# Patient Record
Sex: FEMALE | Race: WHITE | Employment: OTHER | URBAN - METROPOLITAN AREA
[De-identification: names, ages, dates, MRNs, and addresses within clinical notes are randomized per-mention and may not be internally consistent; named-entity substitution may affect disease eponyms.]

---

## 2020-07-16 DIAGNOSIS — F51.01 INSOMNIA, IDIOPATHIC: Primary | ICD-10-CM

## 2020-08-21 VITALS — WEIGHT: 108 LBS | BODY MASS INDEX: 16 KG/M2 | HEIGHT: 69 IN

## 2020-08-21 ASSESSMENT — MIFFLIN-ST. JEOR: SCORE: 1066.32

## 2020-08-21 NOTE — PROGRESS NOTES
"Thelma Mejias is a 69 year old female who is being evaluated via a billable video visit.      The patient has been notified of following:     \"This video visit will be conducted via a call between you and your physician/provider. We have found that certain health care needs can be provided without the need for an in-person physical exam.  This service lets us provide the care you need with a video conversation.  If a prescription is necessary we can send it directly to your pharmacy.  If lab work is needed we can place an order for that and you can then stop by our lab to have the test done at a later time.    Video visits are billed at different rates depending on your insurance coverage.  Please reach out to your insurance provider with any questions.    If during the course of the call the physician/provider feels a video visit is not appropriate, you will not be charged for this service.\"    Patient has given verbal consent for Video visit? Yes  How would you like to obtain your AVS? MyChart  If you are dropped from the video visit, the video invite should be resent to: Send to e-mail at: [email or web address]  Will anyone else be joining your video visit? No    Patient new patient visit for sleep maintenance insomnia.  Life long problem.  Indicates her mother gave her a sleeping medication when she was 9 months old.  She had a PSG done at [facility] about 40 or so years ago.  Does not remember too much.  Subsequently she used zolpidem but only briefly (had a uncertain parasomnia) and then later took temazepam which worked well for a few years, and she has now used it only rarely over the last 10 years.  She also has tried numerous alternative, homeopathic agents, which she not found helpful.     She appears to have two fairly clear problems:   -Advanced circadian rhythm: If given in the opportunity she would fall asleep in the early evening around 7:30pm and sleep until about 11pm-midnight then she will sleep until " "at most 2am.  She fights falling asleep until about 9pm awakens at 11pm then falls asleep and then wakes up at 2am with only minimal sleep until 5am.    -psychophysiological insomnia: clear hypervigilance.  Spends quite a bit of the day trying to sleep.      Restlessness if very tricky.  She indicates she has \"anxious muscles\" and will do yoga stretches in the middle of the night.  Uncertain iron levels.      No history concerning for sleep disordered breathing or parasomnia.      Patient has occasional trouble staying awake during driving.  We spoke about this at some length and on the critical need to never drive if tired or sleepy.     Assessment/Plan:  -Advanced Circadian Rhythm: Use bright light 10,000 lux light box   -Psychophysiological insomnia: CBT-I   If either of these are not particularly helpful we could try treating restlessness with gabapentin or pramipexole.    We will check a ferritin level now  (permission given to leave message at ) and advised that she will take Vitron-C (we discussed side effects) if they are low.     Patient advised to never drive if tired or sleepy.  She indicates that she understands.      Video-Visit Details    Type of service:  Video Visit    Video Start Time: 0727  Video End Time: 0828    Originating Location (pt. Location): Home    Distant Location (provider location):  [facility]     Platform used for Video Visit: Glenna Cuadra MD     Addendum  I called and let the patient know the results of her ferritin which were 63.  Advised working on iron intake in diet if she would like to take one Vitron C a day or every other day that would be fine.  We have discussed side effects.                 "

## 2020-08-21 NOTE — PATIENT INSTRUCTIONS
Your BMI is Body mass index is 16.18 kg/m .  Weight management is a personal decision.  If you are interested in exploring weight loss strategies, the following discussion covers the approaches that may be successful. Body mass index (BMI) is one way to tell whether you are at a healthy weight, overweight, or obese. It measures your weight in relation to your height.  A BMI of 18.5 to 24.9 is in the healthy range. A person with a BMI of 25 to 29.9 is considered overweight, and someone with a BMI of 30 or greater is considered obese. More than two-thirds of American adults are considered overweight or obese.  Being overweight or obese increases the risk for further weight gain. Excess weight may lead to heart disease and diabetes.  Creating and following plans for healthy eating and physical activity may help you improve your health.  Weight control is part of healthy lifestyle and includes exercise, emotional health, and healthy eating habits. Careful eating habits lifelong are the mainstay of weight control. Though there are significant health benefits from weight loss, long-term weight loss with diet alone may be very difficult to achieve- studies show long-term success with dietary management in less than 10% of people. Attaining a healthy weight may be especially difficult to achieve in those with severe obesity. In some cases, medications, devices and surgical management might be considered.  What can you do?  If you are overweight or obese and are interested in methods for weight loss, you should discuss this with your provider.     Consider reducing daily calorie intake by 500 calories.     Keep a food journal.     Avoiding skipping meals, consider cutting portions instead.    Diet combined with exercise helps maintain muscle while optimizing fat loss. Strength training is particularly important for building and maintaining muscle mass. Exercise helps reduce stress, increase energy, and improves fitness.  Increasing exercise without diet control, however, may not burn enough calories to loose weight.       Start walking three days a week 10-20 minutes at a time    Work towards walking thirty minutes five days a week     Eventually, increase the speed of your walking for 1-2 minutes at time    In addition, we recommend that you review healthy lifestyles and methods for weight loss available through the National Institutes of Health patient information sites:  http://win.niddk.nih.gov/publications/index.htm    And look into health and wellness programs that may be available through your health insurance provider, employer, local community center, or radha club.    Weight management plan Patient was referred to their PCP to discuss a diet and exercise plan.

## 2020-08-24 ENCOUNTER — VIRTUAL VISIT (OUTPATIENT)
Dept: SLEEP MEDICINE | Facility: CLINIC | Age: 70
End: 2020-08-24
Payer: COMMERCIAL

## 2020-08-24 DIAGNOSIS — F51.01 INSOMNIA, IDIOPATHIC: Primary | ICD-10-CM

## 2020-08-24 DIAGNOSIS — E61.1 IRON DEFICIENCY: ICD-10-CM

## 2020-08-24 LAB — FERRITIN SERPL-MCNC: 63 NG/ML (ref 8–252)

## 2020-08-24 PROCEDURE — 82728 ASSAY OF FERRITIN: CPT | Performed by: PSYCHIATRY & NEUROLOGY

## 2020-08-24 PROCEDURE — 99204 OFFICE O/P NEW MOD 45 MIN: CPT | Mod: 95 | Performed by: PSYCHIATRY & NEUROLOGY

## 2020-08-24 PROCEDURE — 36415 COLL VENOUS BLD VENIPUNCTURE: CPT | Performed by: PSYCHIATRY & NEUROLOGY

## 2020-08-31 NOTE — NURSING NOTE
Patient's information has been sent to Insomnia Team to call pt to schedule CBTI appointment.    SOFÍA Bacon

## 2020-09-02 ENCOUNTER — TELEPHONE (OUTPATIENT)
Dept: SLEEP MEDICINE | Facility: CLINIC | Age: 70
End: 2020-09-02

## 2020-09-05 ENCOUNTER — MYC MEDICAL ADVICE (OUTPATIENT)
Dept: SLEEP MEDICINE | Facility: CLINIC | Age: 70
End: 2020-09-05

## 2020-09-17 VITALS — BODY MASS INDEX: 16.37 KG/M2 | HEIGHT: 68 IN | WEIGHT: 108 LBS

## 2020-09-17 ASSESSMENT — MIFFLIN-ST. JEOR: SCORE: 1058.38

## 2020-09-17 NOTE — PATIENT INSTRUCTIONS
Your BMI is Body mass index is 16.42 kg/m .  Weight management is a personal decision.  If you are interested in exploring weight loss strategies, the following discussion covers the approaches that may be successful. Body mass index (BMI) is one way to tell whether you are at a healthy weight, overweight, or obese. It measures your weight in relation to your height.  A BMI of 18.5 to 24.9 is in the healthy range. A person with a BMI of 25 to 29.9 is considered overweight, and someone with a BMI of 30 or greater is considered obese. More than two-thirds of American adults are considered overweight or obese.  Being overweight or obese increases the risk for further weight gain. Excess weight may lead to heart disease and diabetes.  Creating and following plans for healthy eating and physical activity may help you improve your health.  Weight control is part of healthy lifestyle and includes exercise, emotional health, and healthy eating habits. Careful eating habits lifelong are the mainstay of weight control. Though there are significant health benefits from weight loss, long-term weight loss with diet alone may be very difficult to achieve- studies show long-term success with dietary management in less than 10% of people. Attaining a healthy weight may be especially difficult to achieve in those with severe obesity. In some cases, medications, devices and surgical management might be considered.  What can you do?  If you are overweight or obese and are interested in methods for weight loss, you should discuss this with your provider.     Consider reducing daily calorie intake by 500 calories.     Keep a food journal.     Avoiding skipping meals, consider cutting portions instead.    Diet combined with exercise helps maintain muscle while optimizing fat loss. Strength training is particularly important for building and maintaining muscle mass. Exercise helps reduce stress, increase energy, and improves fitness.  Increasing exercise without diet control, however, may not burn enough calories to loose weight.       Start walking three days a week 10-20 minutes at a time    Work towards walking thirty minutes five days a week     Eventually, increase the speed of your walking for 1-2 minutes at time    In addition, we recommend that you review healthy lifestyles and methods for weight loss available through the National Institutes of Health patient information sites:  http://win.niddk.nih.gov/publications/index.htm    And look into health and wellness programs that may be available through your health insurance provider, employer, local community center, or radha club.    Weight management plan Patient was referred to their PCP to discuss a diet and exercise plan.

## 2020-09-17 NOTE — PROGRESS NOTES
"Thelma Mejias is a 69 year old female who is being evaluated via a billable video visit.      The patient has been notified of following:     \"This video visit will be conducted via a call between you and your physician/provider. We have found that certain health care needs can be provided without the need for an in-person physical exam.  This service lets us provide the care you need with a video conversation.  If a prescription is necessary we can send it directly to your pharmacy.  If lab work is needed we can place an order for that and you can then stop by our lab to have the test done at a later time.    Video visits are billed at different rates depending on your insurance coverage.  Please reach out to your insurance provider with any questions.    If during the course of the call the physician/provider feels a video visit is not appropriate, you will not be charged for this service.\"    Patient has given verbal consent for Video visit? Yes  How would you like to obtain your AVS? MyChart  If you are dropped from the video visit, the video invite should be resent to: Send to e-mail at: [email or web address]  Will anyone else be joining your video visit? No        Video-Visit Details    Type of service:  Video Visit    Video Start Time: 2:36 PM  Video End Time: 3:10 PM    Originating Location (pt. Location): Home    Distant Location (provider location):  [facility]     Platform used for Video Visit: Eliza Abarca PsyD    SLEEP MEDICINE CONSULTATION  Sleep Psychology    Name: Thelma Mejias MRN# [id number]   Age: 69 year old YOB: 1950     Date of Consultation: Sep 18, 2020  Consultation is requested by: No referring provider defined for this encounter.  Primary care provider: No Ref-Primary, Physician    Reason for Sleep Consultation     Thelma Mejias is a 69 year old female seen today for a behavioral sleep medicine consultation because of " insomnia.      Assessment and Plan     Sleep Diagnoses/Recommendations:    1. Chronic insomnia  Multifactorial insomnia associated with early onset, likely idiopathic with advanced sleep phase component developing in recent years.  Additionally intermittent restless leg symptoms.  Patient has been utilizing bright light in the evening but probably on too high of a dose.  Patient will adjust the use of bright light in the evening to 8 PM for 30 minutes only.  She will continue with stimulus control and current sleep window of about 7.5 hours.  She will also set an alarm and get up consistently at 5:30 AM.  She will follow-up with my colleague Dr. Rober Cuadra regarding ferritin levels and any concern related to restless leg symptoms.      See patient instructions for initial treatment recommendations and behavioral sleep plan.    Summary Counseling:      Thelma was provided information about the pathophysiology of insomnia and psychophysiological factors contributing to the onset and maintenance of insomnia associated with advanced sleep phase, intermittent restless legs, psychophysiologic features.  Treatment option were discussed including component of cognitive-behavioral therapy for insomnia. The benefits and potential early side effects of treatment including increased daytime sleepiness were discussed. She was advised to seek medical advise and consultation around use of or changes to prescription sleep medication, Patient was counseled on the importance of avoiding driving if drowsy.    Continue short nap of 15 minutes after lunch.      Follow-up: 3 weeks     History of Present Sleep Complaint     Thelma Mejias is a 69 year old year old female who reports a near lifelong history of insomnia since childhood. Never napped and will generally chronic course.    After sleep study om 1990 was prescribed zolpidem.  There were significant side effects with this medication so discontinued.    Temazepam was  "introduced in 2000 and was effective for about two years allowing her to get about 6 hours of sleep.  She reports that its effectiveness diminished.  No longer uses sleep aids.    Previous Sleep Studies:    Sleep study at [facility] about 30 years ago.  No sleep apnea or other sleep issues identified.    She was assessed by Dr. Rober Cuadra at [facility] sleep center a having Advanced circadian rhythm: and advised to use a light box in the evening for an hour.  She states it has helped delay her time to sleep onset from 7 pm to 930 PM but makes her feel a bit \"buzzy\" and wonders if it is contributing to additional sleep fragmentation during the first third of the nigh.    Does occasionally have trouble staying awake if driving in the afternoon and regularly take a 10 minute nap in the early afternoon which she finds very refreshing.    Screening          .            PHQ-9 SCORE 9/17/2020   PHQ-9 Total Score MyChart 5 (Mild depression)   PHQ-9 Total Score 5       SHANTE-7 SCORE 9/17/2020   Total Score 3 (minimal anxiety)   Total Score 3       Patient Activation Score   No flowsheet data found.      Vitals     Ht 1.727 m (5' 8\")   Wt 49 kg (108 lb)   BMI 16.42 kg/m       Medical History     There is no problem list on file for this patient.       Current Outpatient Medications   Medication Sig Dispense Refill     azithromycin (ZITHROMAX) 250 MG tablet Take 250 mg (1 tabs) by mouth daily       ethambutol (MYAMBUTOL) 400 MG tablet Take 2 oral daily       probiotic CAPS 2 caps daily       temazepam (RESTORIL) 15 MG capsule Take 15 mg by mouth         No past surgical history on file.       Allergies   Allergen Reactions     Levofloxacin Other (See Comments)     Foot/tendon pain     Shellfish Allergy Other (See Comments)     bilateral hand swelling and itching     Sulfa Drugs Rash         Psychiatric History     Prior Psychiatric Diagnoses: none   Psychiatric Hospitalizations: none   Use of " Psychotropics none      Chemical Use     Prior Chemical Dependency Treatment: none         Family History     No family history on file.    Sleep Disorders: mother with insomnia    Social History     Social History     Socioeconomic History     Marital status: Single     Spouse name: None     Number of children: None     Years of education: None     Highest education level: None   Occupational History     None   Social Needs     Financial resource strain: None     Food insecurity     Worry: None     Inability: None     Transportation needs     Medical: None     Non-medical: None   Tobacco Use     Smoking status: Never Smoker     Smokeless tobacco: Never Used   Substance and Sexual Activity     Alcohol use: None     Drug use: None     Sexual activity: None   Lifestyle     Physical activity     Days per week: None     Minutes per session: None     Stress: None   Relationships     Social connections     Talks on phone: None     Gets together: None     Attends Latter day service: None     Active member of club or organization: None     Attends meetings of clubs or organizations: None     Relationship status: None     Intimate partner violence     Fear of current or ex partner: None     Emotionally abused: None     Physically abused: None     Forced sexual activity: None   Other Topics Concern     None   Social History Narrative     None          Mental Status Examination     Thelma presented as appropriately dressed and groomed and was oriented X3 with speech language intact.  The patient was cooperative throughout the evaluation with no signs of hallucinations, delusions, loosening of associations or other thought disturbance.  Mood was normal.  Affect was congruent with mood. Insight and judgement we intact.  Memory was intact for recent and remote elements.  There was no report of suicidal ideation, intention or plan. Attention and concentration were within normal.          Copy:   No Ref-Primary, Physician                No referring provider defined for this encounter.    Rober Abarca PsyD, LP, DBSM  Diplomate, Behavioral Sleep Medicine  [facility]  [place] [facility] [place]

## 2020-09-18 ENCOUNTER — VIRTUAL VISIT (OUTPATIENT)
Dept: SLEEP MEDICINE | Facility: CLINIC | Age: 70
End: 2020-09-18
Payer: COMMERCIAL

## 2020-09-18 DIAGNOSIS — F51.04 CHRONIC INSOMNIA: Primary | ICD-10-CM

## 2020-09-18 PROCEDURE — 90791 PSYCH DIAGNOSTIC EVALUATION: CPT | Mod: 95 | Performed by: PSYCHOLOGIST

## 2020-10-15 VITALS — BODY MASS INDEX: 16.52 KG/M2 | WEIGHT: 109 LBS | HEIGHT: 68 IN

## 2020-10-15 ASSESSMENT — MIFFLIN-ST. JEOR: SCORE: 1066.89

## 2020-10-15 NOTE — PATIENT INSTRUCTIONS
Your BMI is Body mass index is 16.45 kg/m .  Weight management is a personal decision.  If you are interested in exploring weight loss strategies, the following discussion covers the approaches that may be successful. Body mass index (BMI) is one way to tell whether you are at a healthy weight, overweight, or obese. It measures your weight in relation to your height.  A BMI of 18.5 to 24.9 is in the healthy range. A person with a BMI of 25 to 29.9 is considered overweight, and someone with a BMI of 30 or greater is considered obese. More than two-thirds of American adults are considered overweight or obese.  Being overweight or obese increases the risk for further weight gain. Excess weight may lead to heart disease and diabetes.  Creating and following plans for healthy eating and physical activity may help you improve your health.  Weight control is part of healthy lifestyle and includes exercise, emotional health, and healthy eating habits. Careful eating habits lifelong are the mainstay of weight control. Though there are significant health benefits from weight loss, long-term weight loss with diet alone may be very difficult to achieve- studies show long-term success with dietary management in less than 10% of people. Attaining a healthy weight may be especially difficult to achieve in those with severe obesity. In some cases, medications, devices and surgical management might be considered.  What can you do?  If you are overweight or obese and are interested in methods for weight loss, you should discuss this with your provider.     Consider reducing daily calorie intake by 500 calories.     Keep a food journal.     Avoiding skipping meals, consider cutting portions instead.    Diet combined with exercise helps maintain muscle while optimizing fat loss. Strength training is particularly important for building and maintaining muscle mass. Exercise helps reduce stress, increase energy, and improves fitness.  Next Visit Date:  Future Appointments   Date Time Provider [address] [place]   8/7/2019  2:45 PM Mike Burnett DPM [facility]   10/18/2019  3:30 PM John Scott  [address] [facility]   Topic Date Due    Hepatitis C screen  1961    Diabetic retinal exam  08/27/1970    HIV screen  08/27/1975    Hepatitis B Vaccine (1 of 3 - Risk 3-dose series) 08/27/1979    DTaP/Tdap/Td vaccine (1 - Tdap) 08/27/1979    Shingles Vaccine (1 of 2) 08/27/2010    Colon cancer screen colonoscopy  02/08/2017    Lipid screen  10/16/2018    Flu vaccine (1) 09/01/2019    Potassium monitoring  11/17/2019    Creatinine monitoring  11/17/2019    Diabetic foot exam  03/20/2020    Diabetic microalbuminuria test  03/20/2020    A1C test (Diabetic or Prediabetic)  07/18/2020    Breast cancer screen  04/16/2021    Pneumococcal 0-64 years Vaccine  Completed       Hemoglobin A1C (%)   Date Value   07/18/2019 6.4   03/20/2019 6.8   12/03/2018 9.5             ( goal A1C is < 7)   No results found for: LABMICR  LDL Calculated (mg/dL)   Date Value   10/16/2017 76   12/07/2016 79       (goal LDL is <100)   AST (IU/L)   Date Value   11/17/2018 19     ALT (IU/L)   Date Value   11/17/2018 22     BUN (mg/dL)   Date Value   11/17/2018 19     BP Readings from Last 3 Encounters:   07/18/19 136/80   04/16/19 (!) 142/92   03/20/19 132/80          (goal 120/80)    All Future Testing planned in [facility]  Lab Frequency Next Occurrence   CBC Auto Differential Once 10/13/2018   Comprehensive Metabolic Panel Once 01/10/1588   Lipid Panel Once 10/13/2018   Hemoglobin A1C Once 10/13/2018   XR CHEST STANDARD (2 VW) Once 09/13/2018   XR HIP RIGHT (2-3 VIEWS) Once 12/17/2018               Patient Active Problem List:     S/P REID-BSO     Osteoarthritis     Hypertension     Chronic headaches     Lumbar stenosis with neurogenic claudication     Morbid obesity due to excess calories (HCC)     Colon polyps     Major Increasing exercise without diet control, however, may not burn enough calories to loose weight.       Start walking three days a week 10-20 minutes at a time    Work towards walking thirty minutes five days a week     Eventually, increase the speed of your walking for 1-2 minutes at time    In addition, we recommend that you review healthy lifestyles and methods for weight loss available through the National Institutes of Health patient information sites:  http://win.niddk.nih.gov/publications/index.htm    And look into health and wellness programs that may be available through your health insurance provider, employer, local community center, or radha club.

## 2020-10-15 NOTE — PROGRESS NOTES
"Thelma Mejias is a 69 year old female who is being evaluated via a billable video visit.      The patient has been notified of following:     \"This video visit will be conducted via a call between you and your physician/provider. We have found that certain health care needs can be provided without the need for an in-person physical exam.  This service lets us provide the care you need with a video conversation.  If a prescription is necessary we can send it directly to your pharmacy.  If lab work is needed we can place an order for that and you can then stop by our lab to have the test done at a later time.    Video visits are billed at different rates depending on your insurance coverage.  Please reach out to your insurance provider with any questions.    If during the course of the call the physician/provider feels a video visit is not appropriate, you will not be charged for this service.\"    Patient has given verbal consent for Video visit? Yes  How would you like to obtain your AVS? MyChart  If you are dropped from the video visit, the video invite should be resent to: Send to e-mail at: [email or web address]  Will anyone else be joining your video visit? No      Video-Visit Details    Type of service:  Video Visit    Video Start Time: 1:59 PM  Video End Time: 2:33 PM    Originating Location (pt. Location): Home    Distant Location (provider location):  [facility]     Platform used for Video Visit: Eliza Abarca PsyD    SLEEP MEDICINE VIRTUAL VIDEO VISIT  Sleep Psychology    Patient Name: Thelma Mejias  MRN:  [id number]  Date of Service: Oct 16, 2020       Subjective Report     Thelma Mejias  returns for a telehealth video visit to discuss progress in implementing behavioral strategies for the management of insomnia.  Patient consent for initiation of video visit was obtained and documented prior to initiation of visit.     Thelma reports she is using the " "light box now for 30 minutes at around 8 PM.  Ability to fall asleep has improved.  But is not sleeping well in the middle of the night.    Reports difficulty returning to sleep and early morning waking.    .More sleep disruption this week in particular.  Continues to feel tired.    Up by 530 AM, Stays up now until 9-10 PM.             .     Sleep Data:       .  No sleep diary kept     Interventions     Strategies and recommendations including stimulus control, managing them and sleep phase were discussed today.       Vital Signs     Ht 1.734 m (5' 8.25\")   Wt 49.4 kg (109 lb)   BMI 16.45 kg/m       Mental Status     Speech/Language:  Normal  Thought Process: Intact  Associations:  Normal  Thought Content: Normal    Diagnostic Impressions and Plan       1. Chronic insomnia  Overall sleep efficiency appears to be improved somewhat.  Continue with use of the light box at 8 PM for 30minutes is initially recommended by Dr. Rober Cuadra.  Reduce time in bed to 7 hours between 1030-5:30 AM.  Continue with a brief early afternoon nap.    Follow-up: 4 weeks      Rober Abarca PsyD, LP, DBSM  Diplomate, Behavioral Sleep Medicine  [facility]                        "

## 2020-10-16 ENCOUNTER — VIRTUAL VISIT (OUTPATIENT)
Dept: SLEEP MEDICINE | Facility: CLINIC | Age: 70
End: 2020-10-16
Payer: COMMERCIAL

## 2020-10-16 DIAGNOSIS — F51.04 CHRONIC INSOMNIA: Primary | ICD-10-CM

## 2020-10-16 PROCEDURE — 90832 PSYTX W PT 30 MINUTES: CPT | Mod: 95 | Performed by: PSYCHOLOGIST

## 2020-11-12 ENCOUNTER — VIRTUAL VISIT (OUTPATIENT)
Dept: SLEEP MEDICINE | Facility: CLINIC | Age: 70
End: 2020-11-12
Payer: COMMERCIAL

## 2020-11-12 DIAGNOSIS — F51.04 CHRONIC INSOMNIA: Primary | ICD-10-CM

## 2020-11-12 PROCEDURE — 90832 PSYTX W PT 30 MINUTES: CPT | Mod: 95 | Performed by: PSYCHOLOGIST

## 2020-11-12 RX ORDER — DOXYCYCLINE 100 MG/1
CAPSULE ORAL
COMMUNITY
Start: 2020-05-08 | End: 2020-11-12

## 2020-11-12 RX ORDER — RIFAMPIN 300 MG/1
600 CAPSULE ORAL
COMMUNITY
Start: 2020-10-19

## 2020-11-12 NOTE — PATIENT INSTRUCTIONS
Cognitive Behavioral Therapy for Insomnia (CBT-I)    Developing Healthy Sleep Thoughts    Insomnia is often is triggered by stressful events such as a change in employment, a separation, medical illness, or loss.  How you handle your sleep problem, mainly your thoughts and behaviors, determines in large part whether your sleeplessness is short -term or develops into chronic insomnia well after the stressful event is over.     This step of your program involves learning a set of skills to change negative and worrisome thoughts about sleep.   Insomnia is more likely to persist if you interpret the onset as a threat or loss of control.  Worry, fears and untrue beliefs about insomnia can become a vicious cycle.  Negative sleep thoughts activate the physical and emotional systems of your body.  This strengthens wakefulness and weakens your sleep system.  This in turn produces increased stress and pressure to sleep.  We call this unhelpful sleep effort.     Changing How You Think About Insomnia    We now know that our thoughts and attitudes affect our stress response.  Negative sleep thoughts can worsen your insomnia. They can lead to greater fear or anxiety about sleep, which in turn can aggravate your sleep problem. Thinking more positively and realistically about your sleep promotes its health and healing.     Myths about Sleep    ? People need 8 hours of sleep     This is untrue.  Sleep needs vary from person to person.  Most people need between 6-8 hours to feel alert during the day.  People who sleep 7 hours live longer than those who sleep 8 hours.  Research also suggests people who sleep 5 hours live longer than those who sleep 9 hours    ? Insomnia is the same as sleep deprivation    Sleep deprivation is lack of sleep due to not allowing enough time for sleep.  This is typically not true for insomnia where people have enough time for sleep and even extend their sleep time trying to get more sleep.  Most  people with insomnia get about the same amount of sleep as normal sleepers.  The difference is that with insomnia the sleep is fragmented and less consolidated.    You are Getting More Sleep than You Think    Research using objective sleep tests reveal that people with insomnia get an average of one hour more sleep than they think. This is due in part because the brain misperceives Stage 1 and 2 sleep as being awake.  In addition, stress and arousal while awake in bed changes the brain's perception of time awake.    Examples of Unhealthy Sleep Thoughts    Negative sleep thoughts can have a profound impact on your ability to get a good night's sleep. Below are some examples of negative thoughts associated with insomnia that may sound familiar to you:    ? I must get 8 hours of sleep to function during the day.  ? I won't get to sleep tonight.  ? Insomnia is going to cause health problems.  ? I am dreading going to bed.  ? I woke up early again.  I know I won't get back to sleep.  ? The reason I feel terrible today is because of my insomnia.  ? I've totally lost control of my sleep.  ? I can't sleep without a sleeping pill.    Negative thoughts usually occur automatically and feel like a knee-jerk reaction.  They are often untrue or distorted, especially late in the evening as you become increasingly tired and others are asleep.      Changing Unhealthy Sleep Thoughts    Now that you understand the impact that negative thoughts can have on your sleep, you are ready to change these unhelpful thoughts.  The strategy is powerful and simple:  By recognizing and replacing your negative thoughts about sleep with more accurate, positive thoughts, you will be less anxious and frustrated about your sleep. A more realistic and positive attitude about sleep will allow you to relax and sleep more easily through the night.           There are several important steps involved in changing your unhelpful and negative thoughts about  sleep:            Examples of Healthy Sleep Thoughts    ? My work will not suffer much if I have a poor night's sleep.    ? I'm probably getting more sleep than I think.    ? Other things than my sleep affect my daytime functioning.    ? Because I didn't sleep well last night, I am more likely to sleep well tonight because of increased sleep drive that leads to deeper sleep.    ? Sleep requirements vary from one person to another.    ? If I don't sleep well, most of the time the worst that can happen is that my mood might not be as bright the next day.    ? If I awaken after about 5 hours of sleep, I have gotten the core sleep I need for the day.    ? I'm more likely to fall asleep the longer I've been awake    ? I'm more likely to fall asleep as my body temperature begins to decrease through the night.    ? My body's wakefulness system takes charge during the day to promote daytime functioning.    ? These sleep skills have worked for others, and they can work for me.    Other Recommendations for Changing Beliefs and Attitudes   About Your Sleep    ? Keep Realistic Expectations     There is a widespread belief that 8 hours of sleep is necessary to feel refreshed and function well during the day. Many believe that good sleep means never waking up at night.  Others come to expect that they should always wake up in the morning feeling full of energy.  Concerns may arise when your actual sleep falls short of these expectations. Try to avoid placing undue pressure on yourself to achieve certain sleep levels.  It only increases anxiety about sleeping.  Focus on quality sleep not quantity of sleep.    ? Revise Your Thoughts about the Causes of Insomnia      There is a natural tendency to attribute our sleep problems completely to external factors such as a chemical imbalance, pain, aging or things over which we may have little control.  Although these factors may contribute to your insomnia, research shows CBT-I is  beneficial even if they are present.    ? Don't Blame Insomnia for All Daytime Impairments      Many individuals blame insomnia for every symptom or concern they experience during the day from fatigue to lack of concentration. Though poor sleep may produce some of these symptoms, it us usually untrue that all daytime impairment is attributable to insomnia.  It is more often that other factors such as stress and co-occurring medical problems contribute more to how you feel during the day.      ? Don't Catastrophize      Catastrophic thinking means making a sleep mountain out of a molehill.  Some people believe poor sleep will have catastrophic consequences to their physical health, mental health and appearance. Others see insomnia as a complete loss of control.  These perceived consequences of insomnia often prompt people to seek medication or other treatment.  Keep in mind insomnia can be very unpleasant but for the most part is not dangerous.    ? Don't Focus on Sleep     Some people reduce their activity level because of poor sleep.  Although sleep is a necessary part of life, don't make it the focus of your thoughts and concern. Trust that if you engage in health sleep habits, your body will give you the sleep it needs.  Make sure you continue your normal activities despite your insomnia.    ? Never Try to Sleep      Of all the habits the most important one is this:  Never try to force yourself to sleep.  Doing so usually backfires and makes things worse. Instead, focus on keeping to your prescribed sleep schedule, getting up at the same time every day and using the bed only for sleep.          Cognitive Behavioral Therapy for Insomnia (CBT-I)    Relaxation and Sleep    Winding Down Time    A wind-down time before you go to bed can help you relax your mind and body. Ways to help transition from a busy day to bedtime include things like:    ? Listening to calm music  ? Reading  ? Watching a pleasant or relaxing TV  show  ? Taking a bath    We recommend you set a reminder to begin your wind down time one hour before bedtime.  Many mobile sleep apps such as CBT-I  have wind-down time reminders.    Relaxation and Sleep    Research shows relaxation training can reduce the time it takes to fall asleep and improve sleep quality.  Relaxation training works by reducing physical, emotional and mental arousal that can interfere with your sleep.     Relaxation skills are easy to learn on your own and are widely available free through mobile apps or online.  We recommend doing some form of relaxation exercise daily for at least 10 minutes.  This can include short breathing exercises used throughout the day to help manage stress.    If you are using the CBT-I  mobile vincenzo you will find the following relaxation exercises in the Tools section including Breathing Tools, Progressive Muscle Relaxation and Mindfulness Exercise.    Insight Timer and Calm are two examples of many well-reviewed mobile apps that offer free and for-purchase guided relaxation exercises and meditations.     You Tube is a great resource learning a range of techniques from progressive muscle relaxation to guided imagery.  One example is Five of the Best Sleep Guided Meditations, a set of guided sleep meditations at https://www.Long Play.com/watch?v=wsB_TnQfCxA    Do not use guided relaxation tapes while driving or operating equipment.      Managing Worry before Bed    All human beings worry.  Uncontrolled worry can affect your sleep and health by activating your arousal system.  Worry makes your brain, body and heart behave like there is a danger or threat.  This stress response can make it more difficult to fall asleep and stay asleep. The most common types of worry include:    ? Concerned about unfinished tasks  ? Concern over family, finances or work  ? Worry about things beyond your control.     Scheduling Constructive Worry Time    The part of our brain that  plans, sorts, and helps manage our emotions is less effective in doing its job as nighttime comes.  Without the usual distractions of the day, we tend to worry more and have trouble putting aside our worries.    A simple technique called Constructive Worry Time can help to reduce and manage worry as you approach bedtime or in the middle of the night. It is most effective when practiced daily.

## 2020-11-12 NOTE — PROGRESS NOTES
"Thelma Mejias is a 70 year old female who is being evaluated via a billable video visit.      The patient has been notified of following:     \"This video visit will be conducted via a call between you and your physician/provider. We have found that certain health care needs can be provided without the need for an in-person physical exam.  This service lets us provide the care you need with a video conversation.  If a prescription is necessary we can send it directly to your pharmacy.  If lab work is needed we can place an order for that and you can then stop by our lab to have the test done at a later time.    Video visits are billed at different rates depending on your insurance coverage.  Please reach out to your insurance provider with any questions.    If during the course of the call the physician/provider feels a video visit is not appropriate, you will not be charged for this service.\"    Patient has given verbal consent for Video visit? Yes  How would you like to obtain your AVS? MyChart  If you are dropped from the video visit, the video invite should be resent to: Text to cell phone: [phone]  Will anyone else be joining your video visit? No      Video-Visit Details    Type of service:  Video Visit    Video Start Time: 4:02 PM  Video End Time: 4:30 PM    Originating Location (pt. Location): Home    Distant Location (provider location):  [facility]     Platform used for Video Visit: Eliza Abarca PsyD    SLEEP MEDICINE VIRTUAL VIDEO VISIT  Sleep Psychology    Patient Name: Thelma Mejias  MRN:  [id number]  Date of Service: Nov 12, 2020       Subjective Report     Thelma Mejias  returns for a telehealth video visit to discuss progress in implementing behavioral strategies for the management of insomnia.  Patient consent for initiation of video visit was obtained and documented prior to initiation of visit.     Thelma reports some improvement in " sleep consolidation with extended time asleep during the first third to half of the night.    Patient reports she continues to experience challenges remaining awake in the several hours before her prescribed bedtime.  Discussed biphasic model of sleep.  She is exercising stimulus control in the middle of thie night getting out of bed, doing relaxation or other activities as she awaits sleepiness again.  Average TST is about 5 hours and TIB about 7 with suboptimal SE.    Focused on introducing cognitive restructuring activity relating to sleep specific anxiety and preformance anxiety about sleep..     .     Sleep Data:     Verbal self report     Interventions     Strategies and recommendations including Stimulus control, Advanced sleep phase mgmt. And cognitive restructuring were discussed today.       Vital Signs     There were no vitals taken for this visit.     Mental Status     Speech/Language:  Normal  Thought Process: Intact  Associations:  Normal  Thought Content: Normal    Diagnostic Impressions and Plan       1. Chronic insomnia  Introduced cognitive restructuring exercises sent via [employer].  Review at next office visit. Continue with current sleep window.        Follow-up: 4 weeks      Rober Abarca PsyD, LP, DBSM  Diplomate, Behavioral Sleep Medicine  [facility]

## 2020-12-11 ENCOUNTER — VIRTUAL VISIT (OUTPATIENT)
Dept: SLEEP MEDICINE | Facility: CLINIC | Age: 70
End: 2020-12-11
Payer: COMMERCIAL

## 2020-12-11 VITALS — HEIGHT: 68 IN | WEIGHT: 110 LBS | BODY MASS INDEX: 16.67 KG/M2

## 2020-12-11 DIAGNOSIS — F51.04 CHRONIC INSOMNIA: Primary | ICD-10-CM

## 2020-12-11 PROCEDURE — 90832 PSYTX W PT 30 MINUTES: CPT | Mod: 95 | Performed by: PSYCHOLOGIST

## 2020-12-11 ASSESSMENT — MIFFLIN-ST. JEOR: SCORE: 1062.46

## 2020-12-11 NOTE — PATIENT INSTRUCTIONS
.    Mindfulness and Insomnia reference:            Cognitive Behavioral Therapy for Insomnia (CBT-I)    Developing Healthy Sleep Thoughts    Insomnia is often is triggered by stressful events such as a change in employment, a separation, medical illness, or loss.  How you handle your sleep problem, mainly your thoughts and behaviors, determines in large part whether your sleeplessness is short -term or develops into chronic insomnia well after the stressful event is over.     This step of your program involves learning a set of skills to change negative and worrisome thoughts about sleep.   Insomnia is more likely to persist if you interpret the onset as a threat or loss of control.  Worry, fears and untrue beliefs about insomnia can become a vicious cycle.  Negative sleep thoughts activate the physical and emotional systems of your body.  This strengthens wakefulness and weakens your sleep system.  This in turn produces increased stress and pressure to sleep.  We call this unhelpful sleep effort.     Changing How You Think About Insomnia    We now know that our thoughts and attitudes affect our stress response.  Negative sleep thoughts can worsen your insomnia. They can lead to greater fear or anxiety about sleep, which in turn can aggravate your sleep problem. Thinking more positively and realistically about your sleep promotes its health and healing.     Myths about Sleep    ? People need 8 hours of sleep     This is untrue.  Sleep needs vary from person to person.  Most people need between 6-8 hours to feel alert during the day.  People who sleep 7 hours live longer than those who sleep 8 hours.  Research also suggests people who sleep 5 hours live longer than those who sleep 9 hours    ? Insomnia is the same as sleep deprivation    Sleep deprivation is lack of sleep due to not allowing enough time for sleep.  This is typically not true for insomnia where people have enough time for sleep and even extend their  sleep time trying to get more sleep.  Most people with insomnia get about the same amount of sleep as normal sleepers.  The difference is that with insomnia the sleep is fragmented and less consolidated.    You are Getting More Sleep than You Think    Research using objective sleep tests reveal that people with insomnia get an average of one hour more sleep than they think. This is due in part because the brain misperceives Stage 1 and 2 sleep as being awake.  In addition, stress and arousal while awake in bed changes the brain's perception of time awake.    Examples of Unhealthy Sleep Thoughts    Negative sleep thoughts can have a profound impact on your ability to get a good night's sleep. Below are some examples of negative thoughts associated with insomnia that may sound familiar to you:    ? I must get 8 hours of sleep to function during the day.  ? I won't get to sleep tonight.  ? Insomnia is going to cause health problems.  ? I am dreading going to bed.  ? I woke up early again.  I know I won't get back to sleep.  ? The reason I feel terrible today is because of my insomnia.  ? I've totally lost control of my sleep.  ? I can't sleep without a sleeping pill.    Negative thoughts usually occur automatically and feel like a knee-jerk reaction.  They are often untrue or distorted, especially late in the evening as you become increasingly tired and others are asleep.      Changing Unhealthy Sleep Thoughts    Now that you understand the impact that negative thoughts can have on your sleep, you are ready to change these unhelpful thoughts.  The strategy is powerful and simple:  By recognizing and replacing your negative thoughts about sleep with more accurate, positive thoughts, you will be less anxious and frustrated about your sleep. A more realistic and positive attitude about sleep will allow you to relax and sleep more easily through the night.           There are several important steps involved in changing  your unhelpful and negative thoughts about sleep:            Examples of Healthy Sleep Thoughts    ? My work will not suffer much if I have a poor night's sleep.    ? I'm probably getting more sleep than I think.    ? Other things than my sleep affect my daytime functioning.    ? Because I didn't sleep well last night, I am more likely to sleep well tonight because of increased sleep drive that leads to deeper sleep.    ? Sleep requirements vary from one person to another.    ? If I don't sleep well, most of the time the worst that can happen is that my mood might not be as bright the next day.    ? If I awaken after about 5 hours of sleep, I have gotten the core sleep I need for the day.    ? I'm more likely to fall asleep the longer I've been awake    ? I'm more likely to fall asleep as my body temperature begins to decrease through the night.    ? My body's wakefulness system takes charge during the day to promote daytime functioning.    ? These sleep skills have worked for others, and they can work for me.    Other Recommendations for Changing Beliefs and Attitudes   About Your Sleep    ? Keep Realistic Expectations     There is a widespread belief that 8 hours of sleep is necessary to feel refreshed and function well during the day. Many believe that good sleep means never waking up at night.  Others come to expect that they should always wake up in the morning feeling full of energy.  Concerns may arise when your actual sleep falls short of these expectations. Try to avoid placing undue pressure on yourself to achieve certain sleep levels.  It only increases anxiety about sleeping.  Focus on quality sleep not quantity of sleep.    ? Revise Your Thoughts about the Causes of Insomnia      There is a natural tendency to attribute our sleep problems completely to external factors such as a chemical imbalance, pain, aging or things over which we may have little control.  Although these factors may contribute to  your insomnia, research shows CBT-I is beneficial even if they are present.    ? Don't Blame Insomnia for All Daytime Impairments      Many individuals blame insomnia for every symptom or concern they experience during the day from fatigue to lack of concentration. Though poor sleep may produce some of these symptoms, it us usually untrue that all daytime impairment is attributable to insomnia.  It is more often that other factors such as stress and co-occurring medical problems contribute more to how you feel during the day.      ? Don't Catastrophize      Catastrophic thinking means making a sleep mountain out of a molehill.  Some people believe poor sleep will have catastrophic consequences to their physical health, mental health and appearance. Others see insomnia as a complete loss of control.  These perceived consequences of insomnia often prompt people to seek medication or other treatment.  Keep in mind insomnia can be very unpleasant but for the most part is not dangerous.    ? Don't Focus on Sleep     Some people reduce their activity level because of poor sleep.  Although sleep is a necessary part of life, don't make it the focus of your thoughts and concern. Trust that if you engage in health sleep habits, your body will give you the sleep it needs.  Make sure you continue your normal activities despite your insomnia.    ? Never Try to Sleep      Of all the habits the most important one is this:  Never try to force yourself to sleep.  Doing so usually backfires and makes things worse. Instead, focus on keeping to your prescribed sleep schedule, getting up at the same time every day and using the bed only for sleep.

## 2020-12-11 NOTE — PROGRESS NOTES
"Thelma Mejias is a 70 year old female who is being evaluated via a billable video visit.      The patient has been notified of following:     \"This video visit will be conducted via a call between you and your physician/provider. We have found that certain health care needs can be provided without the need for an in-person physical exam.  This service lets us provide the care you need with a video conversation.  If a prescription is necessary we can send it directly to your pharmacy.  If lab work is needed we can place an order for that and you can then stop by our lab to have the test done at a later time.    Video visits are billed at different rates depending on your insurance coverage.  Please reach out to your insurance provider with any questions.    If during the course of the call the physician/provider feels a video visit is not appropriate, you will not be charged for this service.\"    Patient has given verbal consent for Video visit? Yes  How would you like to obtain your AVS? MyChart  If you are dropped from the video visit, the video invite should be resent to: Send to e-mail at: [email or web address]  Will anyone else be joining your video visit? No    Video-Visit Details    Type of service:  Video Visit    Video Start Time: 2:01 PM  Video End Time: 3:38 PM    Originating Location (pt. Location): Home    Distant Location (provider location):  [facility]     Platform used for Video Visit: Eliza Abarca PsyD      SLEEP MEDICINE VIRTUAL VIDEO FOLLOW-UP VISIT  Sleep Psychology    Patient Name: Thelma Mejias  MRN:  [id number]  Date of Service: Dec 11, 2020       Subjective Report     Thelma Mejias  returns for a telehealth video visit to discuss progress in implementing behavioral strategies for the management of insomnia.  Patient consent for initiation of video visit was obtained and documented prior to initiation of visit.     Thelma reports she has " "been continuing with use of light box at about 8 PM.  Has been attempting to stay up until 10 PM or so. Me regularly able to get 5 hours of sleep and then waking but more able to fall back to sleep.  Usually can't sleep past 4 hours.  Feels it is less frequent that she wakes up for less time in the middle of the night.    Reflected on how she generates stress in the middle of the night.  For example, she reports waking.         .     Sleep Data:     Source of Sleep Estimates:  verbal self-report    See above            Interventions     Strategies and recommendations including maintenance of stimulus control, cognitive restructuring and Mindfulness and insomnia were discussed today.       Vital Signs     Ht 1.727 m (5' 8\")   Wt 49.9 kg (110 lb)   BMI 16.73 kg/m       Mental Status     Appearance:  Well kept  Orientation:  X3  Mood:  normal  Affect:  Congruent with mood  Speech/Language:  Normal  Thought Process: Intact  Associations:  Normal  Thought Content: Normal  Patient does not report any suicidal ideation, intention or plan.    Diagnostic Impressions and Plan     Chronic insomnia    Plan:  continue current sleep schedule and plan and introduce cognitive restructuring module    Follow-up: 4 weeks      Rober Abarca PsyD, SHARA, DBSM  Diplomate, Behavioral Sleep Medicine  [facility]      Note: This dictation was created using voice recognition software. This document may contain an error not identified before finalizing the document. If the error changes the accuracy of the document, I would appreciate it being brought to my attention.                                 "

## 2021-01-14 ENCOUNTER — HEALTH MAINTENANCE LETTER (OUTPATIENT)
Age: 71
End: 2021-01-14

## 2021-01-14 RX ORDER — CHOLECALCIFEROL (VITAMIN D3) 50 MCG
1 TABLET ORAL DAILY
COMMUNITY

## 2021-01-14 NOTE — PROGRESS NOTES
Yael is a 70 year old who is being evaluated via a billable video visit.      How would you like to obtain your AVS? MyChart  If the video visit is dropped, the invitation should be resent by: Text to cell phone: [phone]  Will anyone else be joining your video visit? Radha Falcon MA    Video Start Time: 3:28 PM  {Video-Visit Details    Type of service:  Video Visit    Video End Time:4:10 PM    Originating Location (pt. Location): Home    Distant Location (provider location):  [facility]     Platform used for Video Visit: [facility]     SLEEP [facility] VIRTUAL VIDEO FOLLOW-UP VISIT  Sleep Psychology    Patient Name: Thelma Mejias  MRN:  [id number]  Date of Service: Cesar 15, 2021       Subjective Report     Thelma Mejias  returns for a telehealth video visit to discuss progress in implementing behavioral strategies for the management of insomnia.  Patient consent for initiation of video visit was obtained and documented prior to initiation of visit.     Thelma reports sleep continues to be suboptimal and variable.  She tried to do the mindfulness based book.  She feels sleep deprived .  After getting about 3-4 hours core sleep she reports prominent awakening lasting typically 1-3 hours with return to sleep for  minutes between 4-530 AM.  Patient indicates she has stopped watching clock in middle of the night and despite implementing stimulus control has not reduced WASO.    We discussed biphasic aspects of sleep and alternate strategies to help achieve increase TST at night.  Reviewed history of use of various sleep agents, concerns about mecation use, and possible complementary approaches.  '  Patient did implement mindfulness cognitive based strategies but resulted in no subjective increase in TST or improvement in daytime functioning.     .     Sleep Data:     Source of Sleep Estimates:  verbal self-report    Average sleep over the past few weeks has been about 5  hours with continued wide variability between 2-3 hours and up to 6.5 hours.  She has been napping nearly daily.    Total score - [place]: 12 .    CODY Total Score: 24       Interventions     Strategies and recommendations including maintenance of stimulus control were discussed today.       Vital Signs     There were no vitals taken for this visit.     Mental Status     Appearance:  Well kept  Orientation:  X3  Mood:  normal  Affect:  Congruent with mood  Speech/Language:  Normal  Thought Process: Intact  Associations:  Normal  Thought Content: Normal  Patient does not report any suicidal ideation, intention or plan.    Diagnostic Impressions and Plan     Chronic insomnia    Plan:  Adjust sleep schedule to advance bedtime to 9 PM, continue evening light therapy. Scheduled nap to prevent recurrent inadvertent napping    Follow-up: 3 weeks      Rober Abarca PsyD, SHARA, DBSM  Diplomate, Behavioral Sleep Medicine  [facility]      Note: This dictation was created using voice recognition software. This document may contain an error not identified before finalizing the document. If the error changes the accuracy of the document, I would appreciate it being brought to my attention.

## 2021-01-15 ENCOUNTER — VIRTUAL VISIT (OUTPATIENT)
Dept: SLEEP MEDICINE | Facility: CLINIC | Age: 71
End: 2021-01-15
Payer: COMMERCIAL

## 2021-01-15 DIAGNOSIS — F51.04 CHRONIC INSOMNIA: Primary | ICD-10-CM

## 2021-01-15 PROCEDURE — 90834 PSYTX W PT 45 MINUTES: CPT | Mod: 95 | Performed by: PSYCHOLOGIST

## 2021-02-04 VITALS — HEIGHT: 68 IN | WEIGHT: 110 LBS | BODY MASS INDEX: 16.67 KG/M2

## 2021-02-04 ASSESSMENT — MIFFLIN-ST. JEOR: SCORE: 1062.46

## 2021-02-04 NOTE — PATIENT INSTRUCTIONS
Luther Conley,    Here is the information we discussed:    If you want to consider anxiety treatment contact:  [email or web address]  They have virtual appointments  Phone: [phone]    You may want to consider revisiting whether pharmacotherapy might be helpful for managing anxiety and depression keeping in mind family history.  You might start with you PCP.  Medication such as Buspar have a mechanism of action somewhat different than selective serotonin reuptake inhibitors and are not benzodiazepines.  I do recommend you consult with a professional, usually a psychiatrist, who is skilled in appreciating genetic components affecting metabolism of medication and which, if any might be appropriate for you to consider if interested to assist in treatment of anxiety.    Below is the module on sleep specific thoughts and worry:          Cognitive Behavioral Therapy for Insomnia (CBT-I)    Developing Healthy Sleep Thoughts    Insomnia is often is triggered by stressful events such as a change in employment, a separation, medical illness, or loss.  How you handle your sleep problem, mainly your thoughts and behaviors, determines in large part whether your sleeplessness is short -term or develops into chronic insomnia well after the stressful event is over.     This step of your program involves learning a set of skills to change negative and worrisome thoughts about sleep.   Insomnia is more likely to persist if you interpret the onset as a threat or loss of control.  Worry, fears and untrue beliefs about insomnia can become a vicious cycle.  Negative sleep thoughts activate the physical and emotional systems of your body.  This strengthens wakefulness and weakens your sleep system.  This in turn produces increased stress and pressure to sleep.  We call this unhelpful sleep effort.     Changing How You Think About Insomnia    We now know that our thoughts and attitudes affect our stress response.   Negative sleep thoughts can worsen your insomnia. They can lead to greater fear or anxiety about sleep, which in turn can aggravate your sleep problem. Thinking more positively and realistically about your sleep promotes its health and healing.     Myths about Sleep    ? People need 8 hours of sleep     This is untrue.  Sleep needs vary from person to person.  Most people need between 6-8 hours to feel alert during the day.  People who sleep 7 hours live longer than those who sleep 8 hours.  Research also suggests people who sleep 5 hours live longer than those who sleep 9 hours    ? Insomnia is the same as sleep deprivation    Sleep deprivation is lack of sleep due to not allowing enough time for sleep.  This is typically not true for insomnia where people have enough time for sleep and even extend their sleep time trying to get more sleep.  Most people with insomnia get about the same amount of sleep as normal sleepers.  The difference is that with insomnia the sleep is fragmented and less consolidated.    You are Getting More Sleep than You Think    Research using objective sleep tests reveal that people with insomnia get an average of one hour more sleep than they think. This is due in part because the brain misperceives Stage 1 and 2 sleep as being awake.  In addition, stress and arousal while awake in bed changes the brain's perception of time awake.    Examples of Unhealthy Sleep Thoughts    Negative sleep thoughts can have a profound impact on your ability to get a good night's sleep. Below are some examples of negative thoughts associated with insomnia that may sound familiar to you:    ? I must get 8 hours of sleep to function during the day.  ? I won't get to sleep tonight.  ? Insomnia is going to cause health problems.  ? I am dreading going to bed.  ? I woke up early again.  I know I won't get back to sleep.  ? The reason I feel terrible today is because of my insomnia.  ? I've totally lost control of  my sleep.  ? I can't sleep without a sleeping pill.    Negative thoughts usually occur automatically and feel like a knee-jerk reaction.  They are often untrue or distorted, especially late in the evening as you become increasingly tired and others are asleep.      Changing Unhealthy Sleep Thoughts    Now that you understand the impact that negative thoughts can have on your sleep, you are ready to change these unhelpful thoughts.  The strategy is powerful and simple:  By recognizing and replacing your negative thoughts about sleep with more accurate, positive thoughts, you will be less anxious and frustrated about your sleep. A more realistic and positive attitude about sleep will allow you to relax and sleep more easily through the night.           There are several important steps involved in changing your unhelpful and negative thoughts about sleep:            Examples of Healthy Sleep Thoughts    ? My work will not suffer much if I have a poor night's sleep.    ? I'm probably getting more sleep than I think.    ? Other things than my sleep affect my daytime functioning.    ? Because I didn't sleep well last night, I am more likely to sleep well tonight because of increased sleep drive that leads to deeper sleep.    ? Sleep requirements vary from one person to another.    ? If I don't sleep well, most of the time the worst that can happen is that my mood might not be as bright the next day.    ? If I awaken after about 5 hours of sleep, I have gotten the core sleep I need for the day.    ? I'm more likely to fall asleep the longer I've been awake    ? I'm more likely to fall asleep as my body temperature begins to decrease through the night.    ? My body's wakefulness system takes charge during the day to promote daytime functioning.    ? These sleep skills have worked for others, and they can work for me.    Other Recommendations for Changing Beliefs and Attitudes   About Your Sleep    ? Keep Realistic  Expectations     There is a widespread belief that 8 hours of sleep is necessary to feel refreshed and function well during the day. Many believe that good sleep means never waking up at night.  Others come to expect that they should always wake up in the morning feeling full of energy.  Concerns may arise when your actual sleep falls short of these expectations. Try to avoid placing undue pressure on yourself to achieve certain sleep levels.  It only increases anxiety about sleeping.  Focus on quality sleep not quantity of sleep.    ? Revise Your Thoughts about the Causes of Insomnia      There is a natural tendency to attribute our sleep problems completely to external factors such as a chemical imbalance, pain, aging or things over which we may have little control.  Although these factors may contribute to your insomnia, research shows CBT-I is beneficial even if they are present.    ? Don't Blame Insomnia for All Daytime Impairments      Many individuals blame insomnia for every symptom or concern they experience during the day from fatigue to lack of concentration. Though poor sleep may produce some of these symptoms, it us usually untrue that all daytime impairment is attributable to insomnia.  It is more often that other factors such as stress and co-occurring medical problems contribute more to how you feel during the day.      ? Don't Catastrophize      Catastrophic thinking means making a sleep mountain out of a molehill.  Some people believe poor sleep will have catastrophic consequences to their physical health, mental health and appearance. Others see insomnia as a complete loss of control.  These perceived consequences of insomnia often prompt people to seek medication or other treatment.  Keep in mind insomnia can be very unpleasant but for the most part is not dangerous.    ? Don't Focus on Sleep     Some people reduce their activity level because of poor sleep.  Although sleep is a necessary part of  life, don't make it the focus of your thoughts and concern. Trust that if you engage in health sleep habits, your body will give you the sleep it needs.  Make sure you continue your normal activities despite your insomnia.    ? Never Try to Sleep      Of all the habits the most important one is this:  Never try to force yourself to sleep.  Doing so usually backfires and makes things worse. Instead, focus on keeping to your prescribed sleep schedule, getting up at the same time every day and using the bed only for sleep.

## 2021-02-04 NOTE — PROGRESS NOTES
Yael is a 70 year old who is being evaluated via a billable video visit.      How would you like to obtain your AVS? Mail a copy  If the video visit is dropped, the invitation should be resent by: Send to e-mail at: [email or web address]  Will anyone else be joining your video visit? No     Claudia Cullen CMA        Video Start Time: 2:06 PM  Video-Visit Details    Type of service:  Video Visit    Video End Time:2:45 PM    Originating Location (pt. Location): Home    Distant Location (provider location):  [facility]     Platform used for Video Visit: [facility]     SLEEP [facility] VIRTUAL VIDEO FOLLOW-UP VISIT  Sleep Psychology    Patient Name: Thelma Mejias  MRN:  [id number]  Date of Service: Feb 5, 2021       Subjective Report     Thelma Mejias  returns for a telehealth video visit to discuss progress in implementing behavioral strategies for the management of insomnia.  Patient consent for initiation of video visit was obtained and documented prior to initiation of visit.     Thelma reports that sleep continues to be somewhat variable.  On a couple of nights over the last two weeks after a poor night sleep she did use a sleep medication.    She reports that schedule of going to bed at 9ish with stimulus control helps (with meditation) helps.  At 4 am she will get another 1-1.5 hours of sleep..    Discussed that when she wakes up she continues to feel somewhat anxious about waking up and concern about falling back to sleep.    Use of temazepam reduced to once every two weeks.         .     Sleep Data:     Source of Sleep Estimates:  verbal self-report    Average Sleep Latency: 15-30 min.  Times Awakened: 1-2  Average Wake Time After Sleep Onset: 75 min.  Average Total Sleep Time:  5-6 hrs  Sleep Efficiency: 75=80%      .            Interventions     Strategies and recommendations including maintenance of stimulus control, cognitive restructuring and relaxation were discussed today.  "      Vital Signs     Ht 1.727 m (5' 8\")   Wt 49.9 kg (110 lb)   BMI 16.73 kg/m       Mental Status     Orientation:  X3  Mood:  normal  Affect:  Congruent with mood  Speech/Language:  Normal  Thought Process: Intact  Associations:  Normal  Thought Content: Normal  Patient does not report any suicidal ideation, intention or plan.    Diagnostic Impressions and Plan     Chronic insomnia    Plan:  continue current sleep schedule and plan and Consider CBT for anxiety/depresion, revisit medication strategies for anxiety/depression with her PCP    Follow-up: 3 weeks      Rober Abarca PsyD, LP, DBSM  Diplomate, Behavioral Sleep Medicine  [facility]      Note: This dictation was created using voice recognition software. This document may contain an error not identified before finalizing the document. If the error changes the accuracy of the document, I would appreciate it being brought to my attention.                             "

## 2021-02-05 ENCOUNTER — VIRTUAL VISIT (OUTPATIENT)
Dept: SLEEP MEDICINE | Facility: CLINIC | Age: 71
End: 2021-02-05
Payer: COMMERCIAL

## 2021-02-05 DIAGNOSIS — F51.04 CHRONIC INSOMNIA: Primary | ICD-10-CM

## 2021-02-05 PROCEDURE — 90834 PSYTX W PT 45 MINUTES: CPT | Mod: 95 | Performed by: PSYCHOLOGIST

## 2021-04-29 NOTE — PROGRESS NOTES
Yael is a 70 year old who is being evaluated via a billable video visit.      How would you like to obtain your AVS? MyChart  If the video visit is dropped, the invitation should be resent by: Text to cell phone: [phone]  Will anyone else be joining your video visit? Radha Vargas MA on 4/29/2021 at 11:30 AM    Video Start Time: 2:02 PM  Video-Visit Details    Type of service:  Video Visit    Video End Time:2:31 PM    Originating Location (pt. Location): Home    Distant Location (provider location):  [facility]     Platform used for Video Visit: [facility]     SLEEP MEDICINE VIRTUAL VIDEO FOLLOW-UP VISIT  Sleep Psychology    Patient Name: Thelma Mejias  MRN:  [id number]  Date of Service: Apr 30, 2021       Subjective Report     Thelma Mejias  returns for a telehealth video visit to discuss progress in implementing behavioral strategies for the management of insomnia.  Patient consent for initiation of video visit was obtained and documented prior to initiation of visit.     Thelma reports that what has made a difference is taking melatonin when waking up.  She reports that almost all the time she is able to return back to sleep and get a couple of extra hours of sleep.  We discussed the mild sedative properties of melatonin.  It does not appear to be affecting the timing of her sleep.    Total sleep time on average is now usually 6 hours or more.  We reviewed the importance of consulting with her physician about continued use of melatonin supplements..     .     Sleep Data:     Source of Sleep Estimates:  verbal self-report    Average Sleep Latency: 10 min.  Times Awakened: 1-3  Average Wake Time After Sleep Onset: 30-45 min.  Average Total Sleep Time:  5.5-6 hrs  Sleep Efficiency: 80-85%    EPWORTH SLEEPINESS SCALE    Sitting and reading 1   Watching TV 3   Sitting, inactive in a public place (theatre or mtg.) 1    As a passenger in a car 3   Lying down to rest in  the afternoon when circumstance permit 3   Sitting and talking to someone 0   Sitting quietly after lunch without alcohol 3   In a car, while stopped for a few minutes in traffic 0   TOTAL SCORE (nl <11) 14     INSOMNIA SEVERITY INDEX     Difficulty falling asleep 1   Difficult staying asleep 3   Problems waking up to early 3   How SATISFIED/DISSATISFIED are you with your CURRENT sleep pattern? 2   How NOTICEABLE to others do you think your sleep pattern is in terms of your quality of life? 0   How WORRIED/DISTRESSED are you about your current sleep pattern? 2   To what extent do you consider your sleep problem to INTERFERE with your daily fuctioning(e.g. daytime fatigue, mood, ability to function at work/daily chores, concentration, mood,etc.) CURRENTLY? 3   INSOMNIA SEVERITY INDEX TOTAL SCORE 14    Absence of insomnia (0-7); sub-threshold insomnia (8-14); moderate insomnia (15-21); and severe insomnia (22-28)       Interventions     Strategies and recommendations including implementation of stimulus control and management of strategies for circadian rhythm disorder were discussed today.       Vital Signs     There were no vitals taken for this visit.     Mental Status     Orientation:  X3  Mood:  normal  Affect:  Congruent with mood  Speech/Language:  Normal  Thought Process: Intact  Associations:  Normal  Thought Content: Normal  Patient does not report any suicidal ideation, intention or plan.    Diagnostic Impressions and Plan     Chronic insomnia    Insomnia symptoms have been reduced to subclinical range with combination of behavioral treatment and use of low dose melatonin as recommended by my colleague Dr. Rober Cuadra.  Average TST has increased considerably and she is reporting reduced daytime tiredness.    Plan:  continue current sleep schedule and plan and follow-up with physician about very limitned use of temazepam (once in 30-45 days) and use of low dose melatonin for sleep  maintenance.    Follow-up: as needed      Rober Abarca PsyD, SHARA, DBSM  Diplomate, Behavioral Sleep Medicine  [facility]      Note: This dictation was created using voice recognition software. This document may contain an error not identified before finalizing the document. If the error changes the accuracy of the document, I would appreciate it being brought to my attention.

## 2021-04-29 NOTE — PATIENT INSTRUCTIONS
Scheduling followups by calling [phone].  Feed free to send me a [employer] message if needed for quesitons.  Have a great spring!    Rober Abarca PsyD, SHARA, [facility]  Diplomate, Behavioral Sleep Medicine  [facility]

## 2021-04-30 ENCOUNTER — VIRTUAL VISIT (OUTPATIENT)
Dept: SLEEP MEDICINE | Facility: CLINIC | Age: 71
End: 2021-04-30
Payer: COMMERCIAL

## 2021-04-30 DIAGNOSIS — F51.04 CHRONIC INSOMNIA: Primary | ICD-10-CM

## 2021-04-30 PROCEDURE — 90832 PSYTX W PT 30 MINUTES: CPT | Mod: 95 | Performed by: PSYCHOLOGIST

## 2021-10-24 ENCOUNTER — HEALTH MAINTENANCE LETTER (OUTPATIENT)
Age: 71
End: 2021-10-24

## 2022-02-13 ENCOUNTER — HEALTH MAINTENANCE LETTER (OUTPATIENT)
Age: 72
End: 2022-02-13

## 2022-10-15 ENCOUNTER — HEALTH MAINTENANCE LETTER (OUTPATIENT)
Age: 72
End: 2022-10-15

## 2023-03-26 ENCOUNTER — HEALTH MAINTENANCE LETTER (OUTPATIENT)
Age: 73
End: 2023-03-26

## 2023-10-29 ENCOUNTER — HEALTH MAINTENANCE LETTER (OUTPATIENT)
Age: 73
End: 2023-10-29